# Patient Record
Sex: FEMALE | Race: WHITE | NOT HISPANIC OR LATINO
[De-identification: names, ages, dates, MRNs, and addresses within clinical notes are randomized per-mention and may not be internally consistent; named-entity substitution may affect disease eponyms.]

---

## 2017-05-22 ENCOUNTER — APPOINTMENT (OUTPATIENT)
Dept: OTOLARYNGOLOGY | Facility: CLINIC | Age: 22
End: 2017-05-22

## 2017-05-22 PROBLEM — Z00.00 ENCOUNTER FOR PREVENTIVE HEALTH EXAMINATION: Status: ACTIVE | Noted: 2017-05-22

## 2017-06-06 ENCOUNTER — OUTPATIENT (OUTPATIENT)
Dept: OUTPATIENT SERVICES | Facility: HOSPITAL | Age: 22
LOS: 1 days | Discharge: HOME | End: 2017-06-06

## 2017-06-06 ENCOUNTER — APPOINTMENT (OUTPATIENT)
Dept: OTOLARYNGOLOGY | Facility: AMBULATORY SURGERY CENTER | Age: 22
End: 2017-06-06

## 2017-06-16 ENCOUNTER — APPOINTMENT (OUTPATIENT)
Dept: OTOLARYNGOLOGY | Facility: CLINIC | Age: 22
End: 2017-06-16

## 2017-06-16 VITALS — WEIGHT: 150 LBS | HEIGHT: 67 IN | BODY MASS INDEX: 23.54 KG/M2

## 2017-06-23 ENCOUNTER — APPOINTMENT (OUTPATIENT)
Dept: OTOLARYNGOLOGY | Facility: CLINIC | Age: 22
End: 2017-06-23

## 2017-06-23 VITALS — WEIGHT: 150 LBS | HEIGHT: 67 IN | BODY MASS INDEX: 23.54 KG/M2

## 2017-06-28 DIAGNOSIS — J34.3 HYPERTROPHY OF NASAL TURBINATES: ICD-10-CM

## 2017-06-28 DIAGNOSIS — G47.33 OBSTRUCTIVE SLEEP APNEA (ADULT) (PEDIATRIC): ICD-10-CM

## 2017-07-07 ENCOUNTER — APPOINTMENT (OUTPATIENT)
Dept: OTOLARYNGOLOGY | Facility: CLINIC | Age: 22
End: 2017-07-07

## 2017-10-20 ENCOUNTER — APPOINTMENT (OUTPATIENT)
Dept: OTOLARYNGOLOGY | Facility: CLINIC | Age: 22
End: 2017-10-20

## 2017-11-03 ENCOUNTER — APPOINTMENT (OUTPATIENT)
Dept: OTOLARYNGOLOGY | Facility: CLINIC | Age: 22
End: 2017-11-03
Payer: COMMERCIAL

## 2017-11-03 VITALS — HEIGHT: 67 IN | BODY MASS INDEX: 23.54 KG/M2 | WEIGHT: 150 LBS

## 2017-11-03 DIAGNOSIS — J35.01 CHRONIC TONSILLITIS: ICD-10-CM

## 2017-11-03 DIAGNOSIS — J34.89 OTHER SPECIFIED DISORDERS OF NOSE AND NASAL SINUSES: ICD-10-CM

## 2017-11-03 DIAGNOSIS — J30.9 ALLERGIC RHINITIS, UNSPECIFIED: ICD-10-CM

## 2017-11-03 DIAGNOSIS — Z80.1 FAMILY HISTORY OF MALIGNANT NEOPLASM OF TRACHEA, BRONCHUS AND LUNG: ICD-10-CM

## 2017-11-03 PROCEDURE — 99213 OFFICE O/P EST LOW 20 MIN: CPT | Mod: 25

## 2017-11-03 PROCEDURE — 31231 NASAL ENDOSCOPY DX: CPT

## 2017-11-03 RX ORDER — FLUTICASONE PROPIONATE 50 UG/1
50 SPRAY, METERED NASAL
Qty: 1 | Refills: 2 | Status: ACTIVE | COMMUNITY
Start: 2017-11-03 | End: 1900-01-01

## 2018-09-25 ENCOUNTER — RESULT REVIEW (OUTPATIENT)
Age: 23
End: 2018-09-25

## 2018-09-25 ENCOUNTER — OUTPATIENT (OUTPATIENT)
Dept: OUTPATIENT SERVICES | Facility: HOSPITAL | Age: 23
LOS: 1 days | Discharge: HOME | End: 2018-09-25

## 2018-09-25 DIAGNOSIS — Z01.419 ENCOUNTER FOR GYNECOLOGICAL EXAMINATION (GENERAL) (ROUTINE) WITHOUT ABNORMAL FINDINGS: ICD-10-CM

## 2019-04-05 DIAGNOSIS — Z86.19 PERSONAL HISTORY OF OTHER INFECTIOUS AND PARASITIC DISEASES: ICD-10-CM

## 2019-04-05 RX ORDER — NORETHINDRONE ACETATE AND ETHINYL ESTRADIOL AND FERROUS FUMARATE 1MG-20(21)
1-20 KIT ORAL DAILY
Qty: 3 | Refills: 3 | Status: ACTIVE | COMMUNITY
Start: 2019-04-05 | End: 1900-01-01

## 2019-04-22 RX ORDER — FLUCONAZOLE 150 MG/1
150 TABLET ORAL
Qty: 1 | Refills: 2 | Status: ACTIVE | COMMUNITY
Start: 2019-04-22 | End: 1900-01-01

## 2019-07-16 ENCOUNTER — APPOINTMENT (OUTPATIENT)
Dept: OBGYN | Facility: CLINIC | Age: 24
End: 2019-07-16

## 2019-08-26 ENCOUNTER — APPOINTMENT (OUTPATIENT)
Dept: OBGYN | Facility: CLINIC | Age: 24
End: 2019-08-26
Payer: COMMERCIAL

## 2019-08-26 VITALS
BODY MASS INDEX: 24.64 KG/M2 | HEIGHT: 67 IN | WEIGHT: 157 LBS | SYSTOLIC BLOOD PRESSURE: 120 MMHG | HEART RATE: 87 BPM | RESPIRATION RATE: 18 BRPM | DIASTOLIC BLOOD PRESSURE: 71 MMHG

## 2019-08-26 DIAGNOSIS — R10.2 PELVIC AND PERINEAL PAIN: ICD-10-CM

## 2019-08-26 DIAGNOSIS — N94.89 OTHER SPECIFIED CONDITIONS ASSOCIATED WITH FEMALE GENITAL ORGANS AND MENSTRUAL CYCLE: ICD-10-CM

## 2019-08-26 DIAGNOSIS — N94.5 SECONDARY DYSMENORRHEA: ICD-10-CM

## 2019-08-26 PROCEDURE — 99213 OFFICE O/P EST LOW 20 MIN: CPT | Mod: 25

## 2019-08-26 PROCEDURE — 76830 TRANSVAGINAL US NON-OB: CPT

## 2019-08-26 RX ORDER — ETONOGESTREL AND ETHINYL ESTRADIOL .12; .015 MG/D; MG/D
0.12-0.015 INSERT, EXTENDED RELEASE VAGINAL
Qty: 3 | Refills: 3 | Status: ACTIVE | COMMUNITY
Start: 2019-08-26 | End: 1900-01-01

## 2019-08-26 NOTE — PROCEDURE
[Pelvic Pain] : pelvic pain [Transvaginal Ultrasound] : transvaginal ultrasound [Present] : uterus present [Anteverted] : anteverted [L: ___ cm] : L: [unfilled] cm [W: ___cm] : W: [unfilled] cm [FreeTextEntry6] : THIN LINING [FreeTextEntry8] : 1.77 [FreeTextEntry7] : 1.38 [FreeTextEntry4] : nORMAL EXAM

## 2019-08-26 NOTE — CHIEF COMPLAINT
[Follow Up] : follow up GYN visit [FreeTextEntry1] : GABRIELLE CASALE is a 23 year female\par WITH PAIN

## 2019-11-07 ENCOUNTER — APPOINTMENT (OUTPATIENT)
Dept: OBGYN | Facility: CLINIC | Age: 24
End: 2019-11-07

## 2019-12-03 ENCOUNTER — APPOINTMENT (OUTPATIENT)
Dept: OBGYN | Facility: CLINIC | Age: 24
End: 2019-12-03
Payer: COMMERCIAL

## 2019-12-03 ENCOUNTER — OUTPATIENT (OUTPATIENT)
Dept: OUTPATIENT SERVICES | Facility: HOSPITAL | Age: 24
LOS: 1 days | Discharge: HOME | End: 2019-12-03

## 2019-12-03 VITALS
DIASTOLIC BLOOD PRESSURE: 70 MMHG | SYSTOLIC BLOOD PRESSURE: 114 MMHG | WEIGHT: 150 LBS | BODY MASS INDEX: 23.27 KG/M2 | HEIGHT: 67.5 IN

## 2019-12-03 PROCEDURE — 99395 PREV VISIT EST AGE 18-39: CPT

## 2019-12-04 DIAGNOSIS — Z01.419 ENCOUNTER FOR GYNECOLOGICAL EXAMINATION (GENERAL) (ROUTINE) WITHOUT ABNORMAL FINDINGS: ICD-10-CM

## 2019-12-05 LAB
C TRACH RRNA SPEC QL NAA+PROBE: NOT DETECTED
N GONORRHOEA RRNA SPEC QL NAA+PROBE: NOT DETECTED
SOURCE AMPLIFICATION: NORMAL

## 2020-04-25 ENCOUNTER — MESSAGE (OUTPATIENT)
Age: 25
End: 2020-04-25

## 2020-07-27 RX ORDER — ETONOGESTREL AND ETHINYL ESTRADIOL 11.7; 2.7 MG/1; MG/1
0.12-0.015 INSERT, EXTENDED RELEASE VAGINAL
Qty: 3 | Refills: 3 | Status: ACTIVE | COMMUNITY
Start: 2020-07-27 | End: 1900-01-01

## 2020-07-30 RX ORDER — ETONOGESTREL AND ETHINYL ESTRADIOL .12; .015 MG/D; MG/D
0.12-0.015 INSERT, EXTENDED RELEASE VAGINAL
Qty: 3 | Refills: 3 | Status: ACTIVE | COMMUNITY
Start: 2020-07-30 | End: 1900-01-01

## 2020-11-29 ENCOUNTER — EMERGENCY (EMERGENCY)
Facility: HOSPITAL | Age: 25
LOS: 0 days | Discharge: HOME | End: 2020-11-29
Attending: EMERGENCY MEDICINE | Admitting: EMERGENCY MEDICINE
Payer: COMMERCIAL

## 2020-11-29 VITALS — WEIGHT: 149.91 LBS | HEIGHT: 67 IN

## 2020-11-29 VITALS
HEART RATE: 72 BPM | RESPIRATION RATE: 18 BRPM | TEMPERATURE: 97 F | OXYGEN SATURATION: 100 % | WEIGHT: 139.99 LBS | HEIGHT: 67 IN | SYSTOLIC BLOOD PRESSURE: 139 MMHG | DIASTOLIC BLOOD PRESSURE: 75 MMHG

## 2020-11-29 DIAGNOSIS — S81.812A LACERATION WITHOUT FOREIGN BODY, LEFT LOWER LEG, INITIAL ENCOUNTER: ICD-10-CM

## 2020-11-29 DIAGNOSIS — Z79.899 OTHER LONG TERM (CURRENT) DRUG THERAPY: ICD-10-CM
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
## 2020-11-29 DIAGNOSIS — Z88.0 ALLERGY STATUS TO PENICILLIN: ICD-10-CM

## 2020-11-29 DIAGNOSIS — Z90.89 ACQUIRED ABSENCE OF OTHER ORGANS: ICD-10-CM

## 2020-11-29 DIAGNOSIS — Z98.890 OTHER SPECIFIED POSTPROCEDURAL STATES: Chronic | ICD-10-CM
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
## 2020-11-29 DIAGNOSIS — Z88.8 ALLERGY STATUS TO OTHER DRUGS, MEDICAMENTS AND BIOLOGICAL SUBSTANCES: ICD-10-CM
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
## 2020-11-29 PROCEDURE — 12002 RPR S/N/AX/GEN/TRNK2.6-7.5CM: CPT

## 2020-11-29 PROCEDURE — 99283 EMERGENCY DEPT VISIT LOW MDM: CPT | Mod: 25

## 2020-11-29 RX ORDER — HYDROXYZINE HCL 10 MG
0 TABLET ORAL
Qty: 0 | Refills: 0 | DISCHARGE

## 2020-11-29 NOTE — ED PROVIDER NOTE - NSFOLLOWUPINSTRUCTIONS_ED_ALL_ED_FT
Laceration    A laceration is a cut that goes through all of the layers of the skin and into the tissue that is right under the skin. Some lacerations heal on their own. Others need to be closed with stitches (sutures), staples, skin adhesive strips, or skin glue. Proper laceration care minimizes the risk of infection and helps the laceration to heal better.     SEEK IMMEDIATE MEDICAL CARE IF YOU HAVE THE FOLLOWING SYMPTOMS: swelling around the wound, worsening pain, drainage from the wound, red streaking going away from your wound, inability to move finger or toe near the laceration, or discoloration of skin near the laceration.     suture removal in 14 days

## 2020-11-29 NOTE — ED PROVIDER NOTE - NS ED ROS FT
ROS:     constitutional: no fever, weight loss  msk: no joint pain or difficulty ROM  skin: +laceation   neuro: no tingling , weakness , difficulty ambulation

## 2020-11-29 NOTE — ED PROVIDER NOTE - PHYSICAL EXAMINATION
Vital Signs: I have reviewed the initial vital signs.  Constitutional: well-nourished, no acute distress  Musculoskeletal: good ROM of extremity,  no bony tenderness, no deformity, good peripheral pulses  Integumentary: (+)6.0 cm laceration v shaped to left lower leg. no foreign body. no surrounding erythema or bruising   Neurologic: awake, alert, extremities’ motor and sensory functions grossly intact, no focal deficits  heme: (-) no adenopathy (-)lymphangitis

## 2020-11-29 NOTE — ED PROVIDER NOTE - PATIENT PORTAL LINK FT
You can access the FollowMyHealth Patient Portal offered by [facility] by registering at the following website: [email or web address]. By joining [employer]’s FollowMyHealth portal, you will also be able to view your health information using other applications (apps) compatible with our system.

## 2020-11-29 NOTE — ED PROVIDER NOTE - OBJECTIVE STATEMENT
25 y/o female presents to the Ed s/p direct blow on metal oven sustaining laceration to left lower leg. patient with bleeding from wound. c/o throbbing pain. no tingling distally . no swelling or bruising surrounding wound

## 2020-11-29 NOTE — ED ADULT NURSE NOTE - WOUND TYPE
pt states the door of the stove was down and bumped into the door with her left leg, has laceration to area

## 2020-12-21 PROBLEM — Z86.19 HISTORY OF CANDIDIASIS OF VAGINA: Status: RESOLVED | Noted: 2019-04-22 | Resolved: 2020-12-21

## 2021-01-04 RX ORDER — ETONOGESTREL AND ETHINYL ESTRADIOL 11.7; 2.7 MG/1; MG/1
0.12-0.015 INSERT, EXTENDED RELEASE VAGINAL
Qty: 3 | Refills: 3 | Status: ACTIVE | COMMUNITY
Start: 2021-01-04 | End: 1900-01-01

## 2021-01-06 PROBLEM — Z78.9 OTHER SPECIFIED HEALTH STATUS: Chronic | Status: ACTIVE | Noted: 2020-11-29

## 2021-01-19 ENCOUNTER — APPOINTMENT (OUTPATIENT)
Dept: OBGYN | Facility: CLINIC | Age: 26
End: 2021-01-19
Payer: COMMERCIAL

## 2021-01-19 VITALS
HEIGHT: 67 IN | DIASTOLIC BLOOD PRESSURE: 78 MMHG | BODY MASS INDEX: 23.54 KG/M2 | WEIGHT: 150 LBS | SYSTOLIC BLOOD PRESSURE: 120 MMHG

## 2021-01-19 DIAGNOSIS — E78.00 PURE HYPERCHOLESTEROLEMIA, UNSPECIFIED: ICD-10-CM

## 2021-01-19 PROCEDURE — 99072 ADDL SUPL MATRL&STAF TM PHE: CPT

## 2021-01-19 PROCEDURE — 99395 PREV VISIT EST AGE 18-39: CPT

## 2021-01-19 NOTE — COUNSELING
[Contraception/ Emergency Contraception/ Safe Sexual Practices] : contraception, emergency contraception, safe sexual practices [FreeTextEntry2] : DISCUSSED COVID AND LABS.

## 2021-01-25 LAB — CYTOLOGY CVX/VAG DOC THIN PREP: NORMAL

## 2021-02-02 LAB
ALBUMIN SERPL ELPH-MCNC: 4.5 G/DL
ALP BLD-CCNC: 50 U/L
ALT SERPL-CCNC: 18 U/L
ANION GAP SERPL CALC-SCNC: 11 MMOL/L
AST SERPL-CCNC: 21 U/L
BASOPHILS # BLD AUTO: 0.02 K/UL
BASOPHILS NFR BLD AUTO: 0.5 %
BILIRUB SERPL-MCNC: 0.4 MG/DL
BUN SERPL-MCNC: 19 MG/DL
CALCIUM SERPL-MCNC: 10.6 MG/DL
CHLORIDE SERPL-SCNC: 104 MMOL/L
CHOLEST SERPL-MCNC: 174 MG/DL
CO2 SERPL-SCNC: 25 MMOL/L
CREAT SERPL-MCNC: 1 MG/DL
EOSINOPHIL # BLD AUTO: 0.08 K/UL
EOSINOPHIL NFR BLD AUTO: 1.9 %
GLUCOSE SERPL-MCNC: 86 MG/DL
HCT VFR BLD CALC: 40.3 %
HDLC SERPL-MCNC: 61 MG/DL
HGB BLD-MCNC: 12.6 G/DL
IMM GRANULOCYTES NFR BLD AUTO: 0.2 %
LDLC SERPL CALC-MCNC: 97 MG/DL
LYMPHOCYTES # BLD AUTO: 1.56 K/UL
LYMPHOCYTES NFR BLD AUTO: 37.2 %
MAN DIFF?: NORMAL
MCHC RBC-ENTMCNC: 30.4 PG
MCHC RBC-ENTMCNC: 31.3 G/DL
MCV RBC AUTO: 97.1 FL
MONOCYTES # BLD AUTO: 0.26 K/UL
MONOCYTES NFR BLD AUTO: 6.2 %
NEUTROPHILS # BLD AUTO: 2.26 K/UL
NEUTROPHILS NFR BLD AUTO: 54 %
NONHDLC SERPL-MCNC: 113 MG/DL
PLATELET # BLD AUTO: 218 K/UL
POTASSIUM SERPL-SCNC: 4.5 MMOL/L
PROT SERPL-MCNC: 7.3 G/DL
RBC # BLD: 4.15 M/UL
RBC # FLD: 12.5 %
SARS-COV-2 IGG SERPL IA-ACNC: <0.1 INDEX
SARS-COV-2 IGG SERPL QL IA: NEGATIVE
SODIUM SERPL-SCNC: 140 MMOL/L
TRIGL SERPL-MCNC: 110 MG/DL
WBC # FLD AUTO: 4.19 K/UL

## 2021-03-02 ENCOUNTER — APPOINTMENT (OUTPATIENT)
Dept: OBGYN | Facility: CLINIC | Age: 26
End: 2021-03-02
Payer: COMMERCIAL

## 2021-03-02 VITALS
SYSTOLIC BLOOD PRESSURE: 127 MMHG | HEART RATE: 90 BPM | BODY MASS INDEX: 23.07 KG/M2 | HEIGHT: 67 IN | WEIGHT: 147 LBS | DIASTOLIC BLOOD PRESSURE: 78 MMHG | RESPIRATION RATE: 20 BRPM

## 2021-03-02 DIAGNOSIS — N92.6 IRREGULAR MENSTRUATION, UNSPECIFIED: ICD-10-CM

## 2021-03-02 PROCEDURE — 76830 TRANSVAGINAL US NON-OB: CPT

## 2021-03-02 PROCEDURE — 99072 ADDL SUPL MATRL&STAF TM PHE: CPT

## 2021-03-02 PROCEDURE — 99213 OFFICE O/P EST LOW 20 MIN: CPT | Mod: 25

## 2021-03-02 NOTE — PROCEDURE
[Abnormal Uterine Bleeding] : abnormal uterine bleeding [Transvaginal Ultrasound] : transvaginal ultrasound [Anteverted] : anteverted [L: ___ cm] : L: [unfilled] cm [W: ___cm] : W: [unfilled] cm [FreeTextEntry5] : 2.47 x 2.25 fibroid [FreeTextEntry7] : 1.62 [FreeTextEntry4] : Fibroid uterus.

## 2021-03-02 NOTE — PLAN
[FreeTextEntry1] : GABRIELLE CASALE is a 25 year female\par on Nuvaring.  disussed fibroid uterus.

## 2021-03-25 ENCOUNTER — RX RENEWAL (OUTPATIENT)
Age: 26
End: 2021-03-25

## 2021-03-25 RX ORDER — FLUCONAZOLE 150 MG/1
150 TABLET ORAL
Qty: 1 | Refills: 2 | Status: ACTIVE | COMMUNITY
Start: 2021-03-25 | End: 1900-01-01

## 2021-03-25 RX ORDER — FLUCONAZOLE 150 MG/1
150 TABLET ORAL
Qty: 1 | Refills: 2 | Status: ACTIVE | COMMUNITY
Start: 2021-01-04 | End: 1900-01-01

## 2021-05-18 ENCOUNTER — EMERGENCY (EMERGENCY)
Facility: HOSPITAL | Age: 26
LOS: 0 days | Discharge: HOME | End: 2021-05-18
Attending: EMERGENCY MEDICINE | Admitting: EMERGENCY MEDICINE
Payer: OTHER MISCELLANEOUS

## 2021-05-18 VITALS
DIASTOLIC BLOOD PRESSURE: 84 MMHG | OXYGEN SATURATION: 100 % | HEART RATE: 82 BPM | WEIGHT: 110.01 LBS | SYSTOLIC BLOOD PRESSURE: 136 MMHG | HEIGHT: 67 IN | TEMPERATURE: 98 F | RESPIRATION RATE: 16 BRPM

## 2021-05-18 DIAGNOSIS — Z88.0 ALLERGY STATUS TO PENICILLIN: ICD-10-CM

## 2021-05-18 DIAGNOSIS — Z88.6 ALLERGY STATUS TO ANALGESIC AGENT: ICD-10-CM

## 2021-05-18 DIAGNOSIS — S61.231A PUNCTURE WOUND WITHOUT FOREIGN BODY OF LEFT INDEX FINGER WITHOUT DAMAGE TO NAIL, INITIAL ENCOUNTER: ICD-10-CM

## 2021-05-18 DIAGNOSIS — Z98.890 OTHER SPECIFIED POSTPROCEDURAL STATES: Chronic | ICD-10-CM
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
## 2021-05-18 PROCEDURE — 99284 EMERGENCY DEPT VISIT MOD MDM: CPT

## 2021-05-18 NOTE — ED PROVIDER NOTE - PHYSICAL EXAMINATION
CONSTITUTIONAL: Well-developed; well-nourished; in no acute distress.   SKIN: warm, dry  HEAD: Normocephalic; atraumatic  CARD: S1, S2 normal; no murmurs, gallops, or rubs. Regular rate and rhythm  RESP: No wheezes, rales or rhonchi  EXT: Normal ROM.  No clubbing, cyanosis or edema, left index finger nonerythematous, no rash, no TTP  NEURO: Alert, oriented, grossly unremarkable  PSYCH: Cooperative, appropriate.

## 2021-05-18 NOTE — ED ADULT NURSE NOTE - NSIMPLEMENTINTERV_GEN_ALL_ED
Implemented All Universal Safety Interventions:  [place] to call system. Call bell, personal items and telephone within reach. Instruct patient to call for assistance. Room bathroom lighting operational. Non-slip footwear when patient is off stretcher. Physically safe environment: no spills, clutter or unnecessary equipment. Stretcher in lowest position, wheels locked, appropriate side rails in place.

## 2021-05-18 NOTE — ED PROVIDER NOTE - PATIENT PORTAL LINK FT
You can access the FollowMyHealth Patient Portal offered by [facility] by registering at the following website: [email or web address]. By joining [employer]’s FollowMyHealth portal, you will also be able to view your health information using other applications (apps) compatible with our system.

## 2021-05-18 NOTE — ED PROVIDER NOTE - ATTENDING CONTRIBUTION TO CARE
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 WIll draw baseline labs per protocol, start PrEP, f/u employee health. VS, exam as noted. 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 VS, exam as noted, pt is in communication with RN manager to arrange for pt testing. PrEP, employee health f/u.

## 2021-05-18 NOTE — ED PROVIDER NOTE - OBJECTIVE STATEMENT
26yo w/o sig pmhx who present with needle stick. 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 currently no pain, fever, rash, or discharge. This occurred <1 hr ago.

## 2021-05-18 NOTE — ED PROVIDER NOTE - NSFOLLOWUPCLINICS_GEN_ALL_ED_FT
[facility]  Medicine  [address], [place]   Phone: [phone]  Fax:   Follow Up Time: 1-3 Days

## 2021-05-18 NOTE — ED PROVIDER NOTE - NSFOLLOWUPINSTRUCTIONS_ED_ALL_ED_FT
Please follow up at employee health clinic in 3 days.    Needlestick and Sharps Injury  ImageA needlestick injury happens when a person gets poked (stuck) by a needle or sharp tool (sharps) that may have someone else's blood on it. A needlestick injury can happen to a health care worker, or to anyone who is exposed to needles. The injury may expose you to blood that carries infections such as:  Hepatitis B.  Hepatitis C.  Human immunodeficiency virus (HIV).  If you have a needle stick injury or think you may have been exposed to blood or body fluids:  Wash the injured area right away with soap and water.  Place a bandage or clean towel on the wound and apply gentle pressure to stop the bleeding. Do not squeeze or rub the area.  Notify a work place supervisor or doctor. Follow any procedures in your work place.  Tell your doctor if you are pregnant or breastfeeding.  Treatments may include:  Blood tests to make sure that you have no infection.  Tetanus shot.  Hepatitis B shot.  Medicines to stop or treat infection.  Treatment for the wound.  Follow these instructions at home:  Wound care     There are many ways to close and cover a wound. For example, a wound can be covered with sutures, skin glue, or adhesive strips. Follow instructions from your doctor about:  How to take care of your wound.  When and how you should change your bandage (dressing).  Keep the bandage dry as told by your doctor.   Do not take baths, swim, use a hot tub, or do anything that would put your wound underwater until your doctor approves.  Check your wound every day for signs of infection. Check for:  Redness, swelling, or pain.  Fluid or blood.  Pus or a bad smell.  Warmth.  General instructions     Take over-the-counter and prescription medicines only as told by your doctor.  If you were prescribed an antibiotic medicine, take it as told by your doctor. Do not stop using the antibiotic even if you start to feel better.  Keep all follow-up visits as told by your doctor. This is important.  Contact a doctor if:  The poked area is red, swollen, or painful.  You have a fever.  You feel worried (anxious), mad, or sad (depressed).  You have trouble sleeping.  Your skin or the whites of your eyes look yellow (jaundice).  You have belly pain or a feeling of fullness.  You have tiredness (fatigue).  You feel sickness in a lot of your body (malaise).  You get infections often.  Summary  A needlestick injury happens when a person gets poked (stuck) by a needle that may have someone else's blood on it.  It is treated by cleaning the injured area right away with soap and water. You may get tetanus and hepatitis B shots. You may also get medicines for infections.  Take medicine and care for your wound as told by your doctor.  This information is not intended to replace advice given to you by your health care provider. Make sure you discuss any questions you have with your health care provider.

## 2021-05-18 NOTE — ED PROVIDER NOTE - NS ED ROS FT
Consitutional: No fever, chills, weight loss, generalized fatigue  Eyes:  No visual changes, eye pain or discharge  ENMT:  No hearing changes, pain, discharge or infections; No neck pain, stiffness, or edema  Cardiac:  No chest pain, SOB or edema  Respiratory:  No cough, hemoptysis, or rhinorrhea  GI:  No abdominal pain, nausea, vomiting, constipation or diarrhea  MS:  No myalgia, muscle weakness, joint pain or back pain  Neuro:  No headache or weakness.  No LOC.  Skin:  No skin rash, ecchymosis, abrasion, or lesions

## 2021-07-14 NOTE — ED ADULT NURSE NOTE - CAS DISCH BELONGINGS RETURNED
General: +fever, chills; denies weight loss/weight gain.  HENT: denies nasal congestion, sore throat, rhinorrhea, ear pain.  Eyes: denies head trauma.  Neck: denies neck pain.  CV: denies chest pain.  Resp: denies difficulty breathing.  Abdominal: n/v, abd pain w/ abd distention.  MSK: denies muscle aches, bony pain, leg pain, leg swelling.  Neuro: transient AMS while febrile at home.  Skin: denies rashes, cuts, bruises.  Hematologic: denies unexplained bruises. Not applicable

## 2021-08-13 NOTE — ED ADULT TRIAGE NOTE - HEIGHT IN CM
[facility]  Palliative Care Progress Note  Text Page     Assessment & Plan   Recommendations:  Alexandra Alfaro is a 67 year old male who was admitted on 8/12/2021.   Consulted by Dr. Anil Colin  to assist with symptom management, goals of care, and development of plan of care in the setting of metastatic liver cancer, severe malnutrition, coagulopathies, hyponatremia      Recommendations:  1. Goals of Care- No CPR- Pre-arrest intubation OK  Hospitalization goals discussed  code status discussed with Dr. Navarrete and family changed as above   Decisional Capacity- Intact. Patient does not have an advance directive. Per  informed consent policy next of kin should be involved if patient becomes unable.  POLST  Complete prior to discharge.     2. Pain related to metastatic liver cancer to bone with poor prognosis  Patient's opioid use in past 24 hours: Morphine  IV 2 mg, Dilaudid 0.4 mg =  14 mg Daily Morphine Equivalent  [place] Board of Pharmacy Data Base Reviewed:    YES; As expected, no concern for misuse/abuse of controlled medications based on this report.  ORT  0  ( add dot phrase .FRHORT if warranted)  -dilaudid 2 mg every 2 hrs prn pain/ SOB  -dilaudid 0.2-0.4 mg every hour prn         3. Dyspnea none noted in the setting of ascites   -O2 prn comfort   -ativan po or IV every 4 hrs prn   -dilaudid as above      4. Nausea   -schedule zofran 4-8 mg every 6 hrs     5. Spiritual Care  Oriented to Spiritual Health as part of Palliative Care team. Consultation placed for  to follow. Appreciate care of Og Qureshi,  intern and connect with Wendie  Spiritual Background:  Icelandic      5. Care Planning  SW consultation placed for discharge planning .  Medications for discharge too be determined       Avani Bergeron RN,PGMT-BC ,APRN, CNP,ACHPN  Pain Management and Palliative Care  [facility]  Pgr: [phone]      Time Spent on this Encounter  "  Total unit/floor time 8:50-9:05 am  minutes, time consisted of the following, examination of the patient, reviewing the record and completing documentation. >50% of time spent in counseling and coordination of care, Bedside Nurse Kashmir Manning RN  and Hospitalist Grant Ramsay MD , Carlito Rosario, spiritual health.  Time spend counseling with patient consisted of the following topics, symptom management.    Interval visit:    Comfortable  No pain, + nausea, no SOB. ( used  application on phone  Dr. Navarrete talked to family today.   Daughters present today       Medical Decision Making and Goals of Care:  Discussed on August 12, 2021 with Avani Vergara-Ronnie SOLORZANO, CNP:  I met with the family in the presence of the patient.  The daughter MaMa daughter Tracy and spouse ( non English speaking) in the ED bed.  Patient  Appears very weak and restless.  Selina tells me he vomited and had fever two days ago, saw Dr. Navarrete yesterday. The patient did not want to come back to hospital so they tried to manage at home.  I am familiar with family and patient from last admission.  Selina states they did tell the patient he was dying but not everything.     Used Irish interpretor with family conference w/o patient present.  The family has mixed feeling, but the Aunt was most vocal about goals of care.  They realize the end may be near,but spiritually only \"he knows\" so all must be done until then.  Voiced my concerns about escalation of cares, increasing suffering, futility.  Stated the decisions are their's,  my concerns with suffering and reduced QoL were voiced They agreed all wanted his comfortable and symptoms managed. Some times the better place for him is in the hospital.   Spouse looking exhausted and daughters very sad. Spouse feel asleep and Aunt stated she is so depressed. This is hard on them and the daughters, being the primary caregivers.    I then met with the patient ,aunt and daughter present as well " as interpretor.  He support being home over hospital, but also feels he still wants to come back to hospital, when measures not working at home.  I explained that his liver is not working well and blood not clotting well.  He would prefer to be at home, but again would seek out the hospital if better comfort is available.   The family is considering transition to a comfort plan, but want 24-48 hours of antibiotics IV. I circled back after giving them space to discuss care escalation.  When I returned they decided no change in code status. I again relayed concerns noted above with care escalation.  I offered to have ED provider come in to answer questions and have discussion on code status change and they agreed to this.   Much of their core beliefs are from culture and spirituality of islamic alber.  Will offer support and guidance of [facility] for family and patient.         Review of Systems    CONSTITUTIONAL: NEGATIVE for fever, chills, change in weight  ENT/MOUTH: NEGATIVE for ear, mouth and throat problems  RESP: NEGATIVE for significant cough or SOB  CV: NEGATIVE for chest pain, palpitations or peripheral edema  GI: POSITIVE for, abdominal pain generalized and nausea, ascites    Palliative Symptom Review (0=no symptom/no concern, 1=mild, 2=moderate, 3=severe):      Pain: 0-none      Fatigue: 2-moderate      Nausea: 2-moderate      Constipation: 0-none      Diarrhea: 0-none      Depressive Symptoms: 1-mild      Anxiety: 0-none      Drowsiness: 1-mild      Poor Appetite: 3-severe      Shortness of Breath: 1-mild      Insomnia: 0-none      Other: ascites  2-moderate      Overall (0 good/no concerns, 3 very poor):  2+    Physical Exam   Temp:  [96.1  F (35.6  C)-97.8  F (36.6  C)] 96.1  F (35.6  C)  Pulse:  [] 98  Resp:  [9-30] 16  BP: ()/(43-97) 96/46  SpO2:  [94 %-100 %] 100 %  0 lbs 0 oz  Exam:  GEN:  Alert, oriented x 3, appears comfortable, NAD.  HEENT:  Normocephalic/atraumatic, no scleral icterus, no  nasal discharge, mouth moist.  CV:  RRR, S1, S2; no murmurs or other irregularities noted.  +3 DP/PT pulses bilatererally; no edema BLE.  RESP:  Clear to auscultation bilaterally without rales/rhonchi/wheezing/retractions.  Symmetric chest rise on inhalation noted.  Normal respiratory effort.  ABD:  Rounded, soft, non-tender/non-distended.  +BS, diminished   EXT:  Edema & pulses as noted above.  CMS intact x 4.     M/S:   Nontender to palpation throughout .    SKIN:  Dry to touch, no exanthems noted in the visualized areas.    NEURO: Symmetric strength +5/5.  Sensation to touch intact all extremities.   There is no area of allodynia or hyperesthesia.  PAIN BEHAVIOR: Cooperative  Psych:  Normal affect.  Calm, cooperative, conversant appropriately.    Medications     octreotide (sandoSTATIN) infusion ADULT 50 mcg/hr (08/13/21 0822)     pantoprazole (PROTONIX) infusion ADULT/PEDS GREATER than or EQUAL to 45 kg 8 mg/hr (08/13/21 0817)       artificial saliva  5 mL Swish & Spit 4x Daily     dronabinol  5 mg Oral Daily     ondansetron  4-8 mg Oral Q6H    Or     ondansetron  4-8 mg Intravenous Q6H     piperacillin-tazobactam  3.375 g Intravenous Q6H     sodium chloride (PF)  3 mL Intracatheter Q8H     sodium chloride (PF)  3 mL Intracatheter Q8H     vancomycin  1,000 mg Intravenous Q24H       Data   Results for orders placed or performed during the hospital encounter of 08/12/21 (from the past 24 hour(s))   Comprehensive metabolic panel   Result Value Ref Range    Sodium 131 (L) 133 - 144 mmol/L    Potassium 4.8 3.4 - 5.3 mmol/L    Chloride 101 94 - 109 mmol/L    Carbon Dioxide (CO2) 12 (L) 20 - 32 mmol/L    Anion Gap 18 (H) 3 - 14 mmol/L    Urea Nitrogen 40 (H) 7 - 30 mg/dL    Creatinine 1.69 (H) 0.66 - 1.25 mg/dL    Calcium 7.2 (L) 8.5 - 10.1 mg/dL    Glucose 178 (H) 70 - 99 mg/dL    Alkaline Phosphatase 122 40 - 150 U/L     (H) 0 - 45 U/L     (H) 0 - 70 U/L    Protein Total 5.0 (L) 6.8 - 8.8 g/dL     Albumin 1.0 (L) 3.4 - 5.0 g/dL    Bilirubin Total 10.9 (H) 0.2 - 1.3 mg/dL    GFR Estimate 41 (L) >60 mL/min/1.73m2   INR   Result Value Ref Range    INR 1.79 (H) 0.85 - 1.15   Lactic acid whole blood   Result Value Ref Range    Lactic Acid 10.6 (HH) 0.7 - 2.0 mmol/L   Hemoglobin   Result Value Ref Range    Hemoglobin 7.2 (L) 13.3 - 17.7 g/dL   Lactic acid whole blood   Result Value Ref Range    Lactic Acid 13.3 (HH) 0.7 - 2.0 mmol/L   Hemoglobin   Result Value Ref Range    Hemoglobin 6.8 (LL) 13.3 - 17.7 g/dL   Lactic acid whole blood   Result Value Ref Range    Lactic Acid 9.3 (HH) 0.7 - 2.0 mmol/L   Blood gas venous   Result Value Ref Range    pH Venous 7.28 (L) 7.32 - 7.43    pCO2 Venous 32 (L) 40 - 50 mm Hg    pO2 Venous 47 25 - 47 mm Hg    Bicarbonate Venous 15 (L) 21 - 28 mmol/L    Base Excess/Deficit (+/-) -10.8 (L) -7.7 - 1.9 mmol/L    FIO2 98    CONDITIONAL Prepare red blood cells (unit)   Result Value Ref Range    CROSSMATCH Compatible     UNIT ABO/RH A Pos     Unit Number [id number]     UNIT STATUS Issued     Blood Component Type Red Blood Cells     Product Code T1107G10     CODING SYSTEM [id number]     UNIT TYPE ISBT 6200     ISSUE DATE AND TIME [id number]    Hemoglobin   Result Value Ref Range    Hemoglobin 8.4 (L) 13.3 - 17.7 g/dL   Comprehensive metabolic panel   Result Value Ref Range    Sodium 128 (L) 133 - 144 mmol/L    Potassium 5.9 (H) 3.4 - 5.3 mmol/L    Chloride 100 94 - 109 mmol/L    Carbon Dioxide (CO2) 20 20 - 32 mmol/L    Anion Gap 8 3 - 14 mmol/L    Urea Nitrogen 49 (H) 7 - 30 mg/dL    Creatinine 2.21 (H) 0.66 - 1.25 mg/dL    Calcium 7.6 (L) 8.5 - 10.1 mg/dL    Glucose 174 (H) 70 - 99 mg/dL    Alkaline Phosphatase 177 (H) 40 - 150 U/L    AST 1,755 (HH) 0 - 45 U/L     (HH) 0 - 70 U/L    Protein Total 5.2 (L) 6.8 - 8.8 g/dL    Albumin 1.1 (L) 3.4 - 5.0 g/dL    Bilirubin Total 12.4 (H) 0.2 - 1.3 mg/dL    GFR Estimate 30 (L) >60 mL/min/1.73m2   CBC with platelets   Result Value Ref  Range    WBC Count 13.7 (H) 4.0 - 11.0 10e3/uL    RBC Count 2.51 (L) 4.40 - 5.90 10e6/uL    Hemoglobin 8.4 (L) 13.3 - 17.7 g/dL    Hematocrit 24.5 (L) 40.0 - 53.0 %    MCV 98 78 - 100 fL    MCH 33.5 (H) 26.5 - 33.0 pg    MCHC 34.3 31.5 - 36.5 g/dL    RDW 19.0 (H) 10.0 - 15.0 %    Platelet Count 167 150 - 450 10e3/uL   Lactic acid whole blood   Result Value Ref Range    Lactic Acid 3.9 (H) 0.7 - 2.0 mmol/L   Hematology & Oncology IP Consult: Liver cancer; Consultant may enter orders: Yes; Patient to be seen: Routine - within 24 hours; Requested Clinic/Group: [facility]; Requesting provider? Hospitalist (if different from attending physician)    Narrative    Vinnie Navarrete MD     8/13/2021  1:14 PM    [facility]    Hematology / Oncology Consultation     Date of Admission:  8/12/2021    Assessment & Plan   Alexandra Alfaro is a 67 year old male who was admitted on   8/12/2021. I was asked to see the patient for history of   hepatocellular carcinoma.    Assessment:  Plan:  1.  Hepatocellular carcinoma  -Previous treatments included atezolizumab and bevacizumab.  Also   patient had liver directed therapies including yttrium-90 and   bland embolization.  -Recent scans from July 20th, evidence of progressive disease,   portal vein thrombosis.     Plan   -Had a long discussion with patient and his sister.   -With his declining performance status, worsening disease, and   liver decompensation, my recommendation was to transition to   comfort care only with hospice consultation.   -It is unlikely that further treatment would be helpful or   patient given even tolerate.  -We also discussed, CODE STATUS and I recommended that patient   transition to DNR/DNI, as resuscitative measures would be futile.     Following discussion patient and his sister agreed to switch to   DNR/DNI however they do not want to pursue hospice at this point   and still wanted continue with active treatment.  -Patient's sister mentions that  they do not want to stop   treatment until the end.   -Patient does not wish to go home, even if he is dying he would   prefer to die in hospital or nursing facility, as he does not   want his children or wife to see him in this way.   -They want to move to treatment with lenvatinib, I advised them   that it would not be advisable to start it at present however if   he improves we could pursue lenvatinib at that time.     2.  Portal vein thrombosis  -Patient was on anticoagulation with Lovenox    3.  Spot on bacterial peritonitis  -Patient is on IV antibiotics had paracentesis  -We will keep him on chronic antibiotics on discharge to prevent   further relapses of spontaneous bacterial peritonitis    4 Hepatitis C  -Patient has completed a series of treatment and is seronegative   now    5.  Cirrhosis of liver    6.  GI bleeding  -Would recommend a GI consult    Vinnie Navarrete MD   [facility]  Office:  [phone]    Code Status    No CPR- Pre-arrest intubation OK    Reason for Consult   Reason for consult: History of hepatocellular carcinoma.     Primary Care Physician   Lindy Dodge    Chief Complaint   Fever abdominal pain      History of Present Illness   Alexandra Alfaro is a 67 year old male who presents with fever and   abdominal pain.    Patient is well-known to me, is been undergoing treatment for   hepatocellular carcinoma under my care.  Please see oncologic   chronology below.  Patient was recently hospitalized with similar symptoms of   abdominal pain abdominal distention new ascites and jaundice was   found to have portal vein thrombosis and spontaneous bacterial   peritonitis.   Patient responded to conservative management with paracentesis IV   antibiotics and was discharged home.   He presented to my clinic day before yesterday with complaints of   nausea vomiting abdominal pain fever.  I advised him to go to the   hospital as I was concerned about spontaneous bacterial   peritonitis however  patient did not wanted to go to hospital thus   he was started on oral ciprofloxacin.  Overnight yesterday   patient had high fever and vomited blood and family brought him   to the ER.     This morning he feels better continues to remain very fatigued   has no appetite continues to have pain in abdomen however better   abdominal distention is better.    Oncologic chronology  67-year-old [place] man, was recently hospitalized for COVID-19   pneumonia.   Hospital work-up was significant for a 12.7 x 12.6 x 10.5 cm   heterogeneous right hepatic lobe mass.  Occupying much of the   right hepatic lobe, the findings on MRI were consistent with   hepatocellular carcinoma.  There was hepatic cirrhosis.  Further   work-up showed patient was positive for hepatitis C.  Labs done in the hospital showed  AFP 26 CA was 19.957   bilirubin 0.8 ALT 75 , however these were done at the time   when patient had COVID-19 infection and may not be a true   representation of his baseline liver function.  Patient denies   any history of blood transfusions or IV drug use.  Was not aware   of hepatitis C infection prior to this recent hospital admission.     2/15/2020: Patient had a repeat MRI, and a follow-up with Dr. Hansen in hepatobiliary service at [facility].  The MRI   showed liver mass measuring 13.3 x 11.9 x 13.1 cm.  Unfortunately   there were additional foci of enhancement.  These findings were   compatible with multifocal HCC Dustin RADS category LR 5.  Dr. Hansen recommended against surgical resection.  Patient is now   awaiting interventional radiology consult to see if he would be   candidate for liver directed therapy.   2/24/2020: Patient had a follow-up in medical oncology clinic we   have discussed the results from new MRI and systemic therapy with   atezolizumab and bevacizumab.   3/8/2021 Patient had a CT scan of chest to complete his staging   There was a single borderline enlarged lymph node at  station 2R.   3/8/2021: Patient had a consultation with interventional   radiology interventional radiology recommended thousand arterial   liver directed therapy with yttrium 90 radioembolization.  3/15/2020 when patient had SPECT scan there was inhomogeneous   uptake of activity in right lobe of liver consistent with known   liver tumor there was no focal area of extrahepatic uptake  3/15/2021 patient underwent a planning session with hepatic   arteriogram there was variant hepatic arterial anatomy there was   small foci of disease noted throughout both lobes of liver the   dominant lesion and majority of daughter lesions were consistent   with hepatocellular carcinoma in posterior right hepatic lobe   perfused by accessory right hepatic artery.   A test dose of MAA was delivered to accessory right hepatic   artery which was delivered a high lung shunt of 15.3% Dr. Lopez   felt that due to the shunt he would like to do the procedure over   2 sessions 1 month apart. A liver biopsy was also completed which   confirmed presence ofWell-differentiated hepatocellular   carcinoma.   3/19/2021 patient started on systemic therapy with atezolizumab   alone,  bevacizumab was hold as patient is going through yetrium   90 treatment.   3/25/2021 Patient has the first procedure of yttrium 90   microsphere administration.  Patient did not receive the full   dose due to presence of a hepatopulmonary shunt. Patient   tolerated procedure well.  5/6/2021: Patient has persistent hepatopulmonary shunt and was   unable to receive additional Y 90 radioembolization.  He received   hepatic angiogram and planned embolization of hepatocellular   carcinoma right hepatic lobe.  Procedure was complicated with   significant pain and development of decompensation of liver with   ascites.   7/20/2021 2 8/2/2021: Patient was hospitalized with sudden onset   of abdominal pain and liver decompensation.  Work-up during the   hospital was consistent  with portal vein thrombosis, worsening   hepatocellular carcinoma in the liver, with extension beyond the   liver capsule invading chest wall.  Due to decompensation we   recommended against further treatment, patient will receive   treatment for spontaneous bacterial peritonitis and portal vein   thrombosis with anticoagulation was able to be discharged home.    Past Medical History   I have reviewed this patient's medical history and updated it   with pertinent information if needed.   Past Medical History:   Diagnosis Date     2019 novel coronavirus disease (COVID-19) 12/25/2020     Depression      Hepatitis C      Hepatocellular carcinoma        Past Surgical History   I have reviewed this patient's surgical history and updated it   with pertinent information if needed.  Past Surgical History:   Procedure Laterality Date     DAVINCI HERNIORRHAPHY INGUINAL Right 08/07/2018    Procedure: DAVINCI HERNIORRHAPHY INGUINAL;  Robotic Assisted   Right Inguinal Hernia Repair with Mesh ;  Surgeon: Prabhu Allred MD;  Location: [facility] OR     DIAPHRAGM SURGERY  2018     ERCP with FNA  03/2021     Hepatic angiogram with radioembolization  03/2021       Prior to Admission Medications   Prior to Admission Medications   Prescriptions Last Dose Informant Patient Reported? Taking?   ciprofloxacin (CIPRO) 500 MG tablet 8/11/2021 at Unknown time    Yes Yes   Sig: Take 1 tablet by mouth 2 times daily   dronabinol (MARINOL) 5 MG capsule 8/11/2021 at AM  No Yes   Sig: Take 1 capsule (5 mg) by mouth daily   enoxaparin ANTICOAGULANT (LOVENOX) 120 MG/0.8ML syringe 8/11/2021   at AM  No Yes   Sig: Inject 0.8 mLs (120 mg) Subcutaneous every 24 hours   famotidine (PEPCID) 20 MG tablet 8/11/2021 at AM  No Yes   Sig: Take 1 tablet (20 mg) by mouth 2 times daily   furosemide (LASIX) 20 MG tablet 8/11/2021 at AM  No Yes   Sig: Take 1 tablet (20 mg) by mouth daily   omeprazole (PRILOSEC) 40 MG DR capsule Not Taking at Unknown time    No No    Sig: Take 1 capsule (40 mg) by mouth daily   Patient not taking: Reported on 8/12/2021   oxyCODONE (ROXICODONE) 5 MG tablet 8/12/2021 at PRN  No Yes   Sig: Take 1 tablet (5 mg) by mouth every 8 hours as needed for   pain or severe pain   polyethylene glycol (MIRALAX) 17 GM/Dose powder  at PRN  No Yes   Sig: Take 17 g by mouth daily Do not give if having loose stools   Patient taking differently: Take 17 g by mouth daily as needed Do   not give if having loose stools   senna-docusate (SENOKOT-S/PERICOLACE) 8.6-50 MG tablet  at PRN    No Yes   Sig: Take 2 tablets by mouth 2 times daily Do not give if having   loose stools   Patient taking differently: Take 2 tablets by mouth 2 times daily   as needed Do not give if having loose stools      Facility-Administered Medications: None     Allergies   No Known Allergies    Social History   I have reviewed this patient's social history and updated it with   pertinent information if needed. Alexandra Alfaro  reports that he   quit smoking about 3 years ago. His smoking use included   cigarettes. He has never used smokeless tobacco. He reports that   he does not drink alcohol and does not use drugs.    Family History   I have reviewed this patient's family history and updated it with   pertinent information if needed.   Family History   Problem Relation Age of Onset     No Known Problems Brother      No Known Problems Mother      No Known Problems Sister      No Known Problems Daughter        Review of Systems       Physical Exam   Temp: 97.7  F (36.5  C) Temp src: Oral BP: (!) 80/44 Pulse: 96     Resp: 16 SpO2: 100 % O2 Device: Nasal cannula Oxygen Delivery: 2   LPM  Vital Signs with Ranges  Temp:  [96.1  F (35.6  C)-97.8  F (36.6  C)] 97.7  F (36.5  C)  Pulse:  [85-98] 96  Resp:  [11-30] 16  BP: ()/(44-78) 80/44  SpO2:  [98 %-100 %] 100 %  0 lbs 0 oz    Constitutional: Awake, alert, cooperative, no apparent distress.   ECOG PS 3  Eyes: Conjunctiva and pupils examined  there is pallor and severe   icterus  GI: Soft, abdomen is distended there is ascites hepatomegaly and   bruising on the chest wall   Neurologic: Cranial nerves 2-12 intact, normal strength and   sensation.  Psychiatric: Alert, oriented to person, place and time, no   obvious anxiety or depression.    Data   Laboratory studies reviewed CT scans reviewed discussed with Dr. Jacobo           Hemoglobin   Result Value Ref Range    Hemoglobin 7.7 (L) 13.3 - 17.7 g/dL   Lactic acid whole blood   Result Value Ref Range    Lactic Acid 3.4 (H) 0.7 - 2.0 mmol/L   Potassium   Result Value Ref Range    Potassium 5.9 (H) 3.4 - 5.3 mmol/L        170.18

## 2021-09-03 RX ORDER — ETONOGESTREL AND ETHINYL ESTRADIOL 11.7; 2.7 MG/1; MG/1
0.12-0.015 INSERT, EXTENDED RELEASE VAGINAL
Qty: 3 | Refills: 3 | Status: ACTIVE | COMMUNITY
Start: 2021-09-03 | End: 1900-01-01

## 2022-03-01 ENCOUNTER — LABORATORY RESULT (OUTPATIENT)
Age: 27
End: 2022-03-01

## 2022-03-01 ENCOUNTER — APPOINTMENT (OUTPATIENT)
Dept: OBGYN | Facility: CLINIC | Age: 27
End: 2022-03-01
Payer: COMMERCIAL

## 2022-03-01 VITALS — WEIGHT: 150 LBS | DIASTOLIC BLOOD PRESSURE: 83 MMHG | SYSTOLIC BLOOD PRESSURE: 127 MMHG

## 2022-03-01 PROCEDURE — 99395 PREV VISIT EST AGE 18-39: CPT

## 2022-03-01 NOTE — COUNSELING
[Contraception/ Emergency Contraception/ Safe Sexual Practices] : contraception, emergency contraception, safe sexual practices [FreeTextEntry2] : discussed nuvaring

## 2022-03-08 ENCOUNTER — LABORATORY RESULT (OUTPATIENT)
Age: 27
End: 2022-03-08

## 2022-03-08 LAB — CYTOLOGY CVX/VAG DOC THIN PREP: ABNORMAL

## 2022-03-31 RX ORDER — FLUCONAZOLE 150 MG/1
150 TABLET ORAL
Qty: 1 | Refills: 3 | Status: ACTIVE | COMMUNITY
Start: 2022-03-31 | End: 1900-01-01

## 2022-04-14 RX ORDER — ETONOGESTREL AND ETHINYL ESTRADIOL 11.7; 2.7 MG/1; MG/1
0.12-0.015 INSERT, EXTENDED RELEASE VAGINAL
Qty: 3 | Refills: 3 | Status: ACTIVE | COMMUNITY
Start: 2022-04-14 | End: 1900-01-01

## 2022-05-02 ENCOUNTER — EMERGENCY (EMERGENCY)
Facility: HOSPITAL | Age: 27
LOS: 0 days | Discharge: HOME | End: 2022-05-03
Attending: EMERGENCY MEDICINE | Admitting: EMERGENCY MEDICINE
Payer: COMMERCIAL

## 2022-05-02 VITALS
OXYGEN SATURATION: 98 % | HEART RATE: 88 BPM | DIASTOLIC BLOOD PRESSURE: 69 MMHG | RESPIRATION RATE: 18 BRPM | WEIGHT: 149.91 LBS | SYSTOLIC BLOOD PRESSURE: 117 MMHG | HEIGHT: 67 IN | TEMPERATURE: 98 F

## 2022-05-02 VITALS — TEMPERATURE: 101 F

## 2022-05-02 DIAGNOSIS — K52.9 NONINFECTIVE GASTROENTERITIS AND COLITIS, UNSPECIFIED: ICD-10-CM

## 2022-05-02 DIAGNOSIS — Z88.0 ALLERGY STATUS TO PENICILLIN: ICD-10-CM

## 2022-05-02 DIAGNOSIS — R10.9 UNSPECIFIED ABDOMINAL PAIN: ICD-10-CM

## 2022-05-02 DIAGNOSIS — R19.7 DIARRHEA, UNSPECIFIED: ICD-10-CM

## 2022-05-02 DIAGNOSIS — Z98.890 OTHER SPECIFIED POSTPROCEDURAL STATES: Chronic | ICD-10-CM

## 2022-05-02 DIAGNOSIS — R50.9 FEVER, UNSPECIFIED: ICD-10-CM

## 2022-05-02 DIAGNOSIS — Z88.6 ALLERGY STATUS TO ANALGESIC AGENT: ICD-10-CM

## 2022-05-02 DIAGNOSIS — R11.10 VOMITING, UNSPECIFIED: ICD-10-CM

## 2022-05-02 LAB
ALBUMIN SERPL ELPH-MCNC: 4.6 G/DL — SIGNIFICANT CHANGE UP (ref 3.5–5.2)
ALP SERPL-CCNC: 57 U/L — SIGNIFICANT CHANGE UP (ref 30–115)
ALT FLD-CCNC: 11 U/L — SIGNIFICANT CHANGE UP (ref 0–41)
ANION GAP SERPL CALC-SCNC: 10 MMOL/L — SIGNIFICANT CHANGE UP (ref 7–14)
APPEARANCE UR: CLEAR — SIGNIFICANT CHANGE UP
APTT BLD: 30 SEC — SIGNIFICANT CHANGE UP (ref 27–39.2)
AST SERPL-CCNC: 15 U/L — SIGNIFICANT CHANGE UP (ref 0–41)
BASOPHILS # BLD AUTO: 0.02 K/UL — SIGNIFICANT CHANGE UP (ref 0–0.2)
BASOPHILS NFR BLD AUTO: 0.2 % — SIGNIFICANT CHANGE UP (ref 0–1)
BILIRUB SERPL-MCNC: 0.6 MG/DL — SIGNIFICANT CHANGE UP (ref 0.2–1.2)
BILIRUB UR-MCNC: NEGATIVE — SIGNIFICANT CHANGE UP
BUN SERPL-MCNC: 15 MG/DL — SIGNIFICANT CHANGE UP (ref 10–20)
CALCIUM SERPL-MCNC: 10.1 MG/DL — SIGNIFICANT CHANGE UP (ref 8.5–10.1)
CHLORIDE SERPL-SCNC: 103 MMOL/L — SIGNIFICANT CHANGE UP (ref 98–110)
CO2 SERPL-SCNC: 27 MMOL/L — SIGNIFICANT CHANGE UP (ref 17–32)
COLOR SPEC: YELLOW — SIGNIFICANT CHANGE UP
CREAT SERPL-MCNC: 0.9 MG/DL — SIGNIFICANT CHANGE UP (ref 0.7–1.5)
DIFF PNL FLD: NEGATIVE — SIGNIFICANT CHANGE UP
EGFR: 91 ML/MIN/1.73M2 — SIGNIFICANT CHANGE UP
EOSINOPHIL # BLD AUTO: 0.05 K/UL — SIGNIFICANT CHANGE UP (ref 0–0.7)
EOSINOPHIL NFR BLD AUTO: 0.4 % — SIGNIFICANT CHANGE UP (ref 0–8)
GLUCOSE SERPL-MCNC: 93 MG/DL — SIGNIFICANT CHANGE UP (ref 70–99)
GLUCOSE UR QL: NEGATIVE MG/DL — SIGNIFICANT CHANGE UP
HCG SERPL QL: NEGATIVE — SIGNIFICANT CHANGE UP
HCT VFR BLD CALC: 40 % — SIGNIFICANT CHANGE UP (ref 37–47)
HGB BLD-MCNC: 12.9 G/DL — SIGNIFICANT CHANGE UP (ref 12–16)
IMM GRANULOCYTES NFR BLD AUTO: 0.4 % — HIGH (ref 0.1–0.3)
INR BLD: 1.11 RATIO — SIGNIFICANT CHANGE UP (ref 0.65–1.3)
KETONES UR-MCNC: 15
LEUKOCYTE ESTERASE UR-ACNC: NEGATIVE — SIGNIFICANT CHANGE UP
LIDOCAIN IGE QN: 27 U/L — SIGNIFICANT CHANGE UP (ref 7–60)
LYMPHOCYTES # BLD AUTO: 0.65 K/UL — LOW (ref 1.2–3.4)
LYMPHOCYTES # BLD AUTO: 5.1 % — LOW (ref 20.5–51.1)
MCHC RBC-ENTMCNC: 30 PG — SIGNIFICANT CHANGE UP (ref 27–31)
MCHC RBC-ENTMCNC: 32.3 G/DL — SIGNIFICANT CHANGE UP (ref 32–37)
MCV RBC AUTO: 93 FL — SIGNIFICANT CHANGE UP (ref 81–99)
MONOCYTES # BLD AUTO: 0.43 K/UL — SIGNIFICANT CHANGE UP (ref 0.1–0.6)
MONOCYTES NFR BLD AUTO: 3.4 % — SIGNIFICANT CHANGE UP (ref 1.7–9.3)
NEUTROPHILS # BLD AUTO: 11.55 K/UL — HIGH (ref 1.4–6.5)
NEUTROPHILS NFR BLD AUTO: 90.5 % — HIGH (ref 42.2–75.2)
NITRITE UR-MCNC: NEGATIVE — SIGNIFICANT CHANGE UP
NRBC # BLD: 0 /100 WBCS — SIGNIFICANT CHANGE UP (ref 0–0)
PH UR: 6 — SIGNIFICANT CHANGE UP (ref 5–8)
PLATELET # BLD AUTO: 197 K/UL — SIGNIFICANT CHANGE UP (ref 130–400)
POTASSIUM SERPL-MCNC: 4.2 MMOL/L — SIGNIFICANT CHANGE UP (ref 3.5–5)
POTASSIUM SERPL-SCNC: 4.2 MMOL/L — SIGNIFICANT CHANGE UP (ref 3.5–5)
PROT SERPL-MCNC: 7.6 G/DL — SIGNIFICANT CHANGE UP (ref 6–8)
PROT UR-MCNC: NEGATIVE MG/DL — SIGNIFICANT CHANGE UP
PROTHROM AB SERPL-ACNC: 12.8 SEC — SIGNIFICANT CHANGE UP (ref 9.95–12.87)
RBC # BLD: 4.3 M/UL — SIGNIFICANT CHANGE UP (ref 4.2–5.4)
RBC # FLD: 12.3 % — SIGNIFICANT CHANGE UP (ref 11.5–14.5)
SODIUM SERPL-SCNC: 140 MMOL/L — SIGNIFICANT CHANGE UP (ref 135–146)
SP GR SPEC: 1.02 — SIGNIFICANT CHANGE UP (ref 1.01–1.03)
UROBILINOGEN FLD QL: 0.2 MG/DL — SIGNIFICANT CHANGE UP
WBC # BLD: 12.75 K/UL — HIGH (ref 4.8–10.8)
WBC # FLD AUTO: 12.75 K/UL — HIGH (ref 4.8–10.8)

## 2022-05-02 PROCEDURE — 74177 CT ABD & PELVIS W/CONTRAST: CPT | Mod: 26,MA

## 2022-05-02 PROCEDURE — 99285 EMERGENCY DEPT VISIT HI MDM: CPT

## 2022-05-02 RX ORDER — METRONIDAZOLE 500 MG
500 TABLET ORAL ONCE
Refills: 0 | Status: COMPLETED | OUTPATIENT
Start: 2022-05-02 | End: 2022-05-02

## 2022-05-02 RX ORDER — FAMOTIDINE 10 MG/ML
25 INJECTION INTRAVENOUS ONCE
Refills: 0 | Status: DISCONTINUED | OUTPATIENT
Start: 2022-05-02 | End: 2022-05-02

## 2022-05-02 RX ORDER — SODIUM CHLORIDE 9 MG/ML
1000 INJECTION INTRAMUSCULAR; INTRAVENOUS; SUBCUTANEOUS ONCE
Refills: 0 | Status: COMPLETED | OUTPATIENT
Start: 2022-05-02 | End: 2022-05-02

## 2022-05-02 RX ORDER — MOXIFLOXACIN HYDROCHLORIDE TABLETS, 400 MG 400 MG/1
1 TABLET, FILM COATED ORAL
Qty: 20 | Refills: 0
Start: 2022-05-02 | End: 2022-05-11

## 2022-05-02 RX ORDER — ONDANSETRON 8 MG/1
4 TABLET, FILM COATED ORAL ONCE
Refills: 0 | Status: COMPLETED | OUTPATIENT
Start: 2022-05-02 | End: 2022-05-02

## 2022-05-02 RX ORDER — ACETAMINOPHEN 500 MG
650 TABLET ORAL ONCE
Refills: 0 | Status: COMPLETED | OUTPATIENT
Start: 2022-05-02 | End: 2022-05-02

## 2022-05-02 RX ORDER — CIPROFLOXACIN LACTATE 400MG/40ML
500 VIAL (ML) INTRAVENOUS ONCE
Refills: 0 | Status: COMPLETED | OUTPATIENT
Start: 2022-05-02 | End: 2022-05-02

## 2022-05-02 RX ORDER — FAMOTIDINE 10 MG/ML
20 INJECTION INTRAVENOUS ONCE
Refills: 0 | Status: COMPLETED | OUTPATIENT
Start: 2022-05-02 | End: 2022-05-02

## 2022-05-02 RX ORDER — METRONIDAZOLE 500 MG
1 TABLET ORAL
Qty: 21 | Refills: 0
Start: 2022-05-02 | End: 2022-05-08

## 2022-05-02 RX ORDER — IOHEXOL 300 MG/ML
30 INJECTION, SOLUTION INTRAVENOUS ONCE
Refills: 0 | Status: COMPLETED | OUTPATIENT
Start: 2022-05-02 | End: 2022-05-02

## 2022-05-02 RX ORDER — MORPHINE SULFATE 50 MG/1
2 CAPSULE, EXTENDED RELEASE ORAL ONCE
Refills: 0 | Status: DISCONTINUED | OUTPATIENT
Start: 2022-05-02 | End: 2022-05-02

## 2022-05-02 RX ADMIN — Medication 650 MILLIGRAM(S): at 22:46

## 2022-05-02 RX ADMIN — ONDANSETRON 104 MILLIGRAM(S): 8 TABLET, FILM COATED ORAL at 22:46

## 2022-05-02 RX ADMIN — FAMOTIDINE 20 MILLIGRAM(S): 10 INJECTION INTRAVENOUS at 17:48

## 2022-05-02 RX ADMIN — MORPHINE SULFATE 2 MILLIGRAM(S): 50 CAPSULE, EXTENDED RELEASE ORAL at 17:47

## 2022-05-02 RX ADMIN — ONDANSETRON 104 MILLIGRAM(S): 8 TABLET, FILM COATED ORAL at 17:47

## 2022-05-02 RX ADMIN — IOHEXOL 30 MILLILITER(S): 300 INJECTION, SOLUTION INTRAVENOUS at 17:48

## 2022-05-02 RX ADMIN — SODIUM CHLORIDE 1 MILLILITER(S): 9 INJECTION INTRAMUSCULAR; INTRAVENOUS; SUBCUTANEOUS at 17:48

## 2022-05-02 NOTE — ED PROVIDER NOTE - ATTENDING APP SHARED VISIT CONTRIBUTION OF CARE
25 yo F presents for evaluation of  abd pain , started today while taking a class.  Pt with vomiting and episode of loose stool,  no urinary complaints, no recent illness,  no rash , On exam pt in NAD AAO X 3, non toxic, OP clear, MMM, Lungs cta b/l no wrr, abd soft nd, + tender lower abdomen, no rebound, no guarding

## 2022-05-02 NOTE — ED PROVIDER NOTE - CLINICAL SUMMARY MEDICAL DECISION MAKING FREE TEXT BOX
26-year-old female no past medical history presents with mid abdominal pain associated with vomiting and diarrhea nonbloody became low-grade fever in ED WBC 12 urine clear CT abdomen pelvis with IV and oral contrast Mild circumferential thickening of the distal duodenum and proximal   	ascending colon, nonspecific possibly infectious/inflammatory in etiology  	  	Normal caliber appendix.  Antibiotics given repeat abdominal exam nontender  Patient to be discharged from ED in well appearing condition. Any available test results were discussed with and printed  for patient.  Verbal instructions given, including instructions to return to ED immediately for any new, worsening, or concerning symptoms. Limitations of ED work up discussed.  Patient reports understanding of above with capacity and insight. Written discharge instructions additionally given, including follow-up plan.

## 2022-05-02 NOTE — ED PROVIDER NOTE - NS ED ATTENDING STATEMENT MOD
This was a shared visit with the JAMEE. I reviewed and verified the documentation and independently performed the documented:

## 2022-05-02 NOTE — ED PROVIDER NOTE - PATIENT PORTAL LINK FT
You can access the FollowMyHealth Patient Portal offered by [facility] by registering at the following website: [email or web address]. By joining [facility]’s FollowMyHealth portal, you will also be able to view your health information using other applications (apps) compatible with our system.

## 2022-05-02 NOTE — ED PROVIDER NOTE - NS ED ROS FT
Review of Systems:  	•	CONSTITUTIONAL - no fever, no diaphoresis, no chills  	•	SKIN - no rash  	•	HEMATOLOGIC - no bleeding, no bruising  	•	EYES - no eye pain, no blurry vision  	•	ENT - no change in hearing, no sore throat, no ear pain or tinnitus  	•	RESPIRATORY - no shortness of breath, no cough  	•	CARDIAC - no chest pain, no palpitations  	•	GI - abd pain, nausea,  vomiting,and  diarrhea, no constipation  	•	GENITO-URINARY - no discharge, no dysuria; no hematuria, no increased urinary frequency  	•	MUSCULOSKELETAL - no joint paint, no swelling, no redness  	•	NEUROLOGIC - no weakness, no headache, no paresthesias, no LOC  	•	PSYCH - no anxiety, non suicidal, non homicidal, no hallucination, no depression

## 2022-05-02 NOTE — ED PROVIDER NOTE - CARE PROVIDER_API CALL
Madhavi Chris (MD)  Gastroenterology  [address] [place] [address]  Phone: [phone]  Fax: [phone]  Follow Up Time:

## 2022-05-02 NOTE — ED PROVIDER NOTE - OBJECTIVE STATEMENT
this is a 27 yo female presents to ed for evaluation of abdominal pain which started today. patient states she had episodes of diarrhea and then was vomiting.

## 2022-05-02 NOTE — ED PROVIDER NOTE - PHYSICAL EXAMINATION
--EXAM--  VITAL SIGNS: I have reviewed vs documented at present.  CONSTITUTIONAL: Well-developed; well-nourished; in no acute distress.   SKIN: Warm and dry, no acute rash.   HEAD: Normocephalic; atraumatic.  EYES: PERRL, EOM intact; conjunctiva and sclera clear. No nystagmus.  ENT: No nasal discharge; airway clear.  NECK: Supple; non tender.  CARD: S1, S2, Regular rate and rhythm.   RESP: No wheezes, rales or rhonchi.  ABD: Normal bowel sounds; soft; non-distended; lower abdominal tenderness   EXT: Normal ROM.   NEURO: Alert, oriented, grossly unremarkable. Strength 5/5 in all extremities. Sensation intact throughout.  PSYCH: Cooperative, appropriate.

## 2022-05-03 RX ADMIN — Medication 500 MILLIGRAM(S): at 00:25

## 2022-05-04 LAB
CULTURE RESULTS: SIGNIFICANT CHANGE UP
SPECIMEN SOURCE: SIGNIFICANT CHANGE UP

## 2022-07-11 RX ORDER — ETONOGESTREL AND ETHINYL ESTRADIOL 11.7; 2.7 MG/1; MG/1
0.12-0.015 INSERT, EXTENDED RELEASE VAGINAL
Qty: 3 | Refills: 3 | Status: ACTIVE | COMMUNITY
Start: 2022-07-11 | End: 1900-01-01

## 2023-03-07 ENCOUNTER — NON-APPOINTMENT (OUTPATIENT)
Age: 28
End: 2023-03-07

## 2023-03-07 ENCOUNTER — APPOINTMENT (OUTPATIENT)
Dept: OBGYN | Facility: CLINIC | Age: 28
End: 2023-03-07
Payer: COMMERCIAL

## 2023-03-07 VITALS
BODY MASS INDEX: 24.33 KG/M2 | HEIGHT: 67 IN | DIASTOLIC BLOOD PRESSURE: 82 MMHG | SYSTOLIC BLOOD PRESSURE: 126 MMHG | WEIGHT: 155 LBS

## 2023-03-07 PROCEDURE — 99395 PREV VISIT EST AGE 18-39: CPT

## 2023-03-11 LAB — CYTOLOGY CVX/VAG DOC THIN PREP: NORMAL

## 2023-07-30 ENCOUNTER — RX RENEWAL (OUTPATIENT)
Age: 28
End: 2023-07-30

## 2023-07-30 RX ORDER — ETONOGESTREL AND ETHINYL ESTRADIOL .12; .015 MG/D; MG/D
0.12-0.015 RING VAGINAL
Qty: 3 | Refills: 0 | Status: ACTIVE | COMMUNITY
Start: 2023-07-30 | End: 1900-01-01

## 2023-09-08 DIAGNOSIS — Z30.9 ENCOUNTER FOR CONTRACEPTIVE MANAGEMENT, UNSPECIFIED: ICD-10-CM

## 2023-09-08 RX ORDER — ETONOGESTREL AND ETHINYL ESTRADIOL 11.7; 2.7 MG/1; MG/1
0.12-0.015 INSERT, EXTENDED RELEASE VAGINAL
Qty: 3 | Refills: 3 | Status: ACTIVE | COMMUNITY
Start: 2023-09-08 | End: 1900-01-01

## 2024-05-20 ENCOUNTER — RX RENEWAL (OUTPATIENT)
Age: 29
End: 2024-05-20

## 2024-05-20 RX ORDER — ETONOGESTREL AND ETHINYL ESTRADIOL .015; .12 MG/D; MG/D
0.12-0.015 INSERT, EXTENDED RELEASE VAGINAL
Qty: 3 | Refills: 0 | Status: ACTIVE | COMMUNITY
Start: 2024-05-20 | End: 1900-01-01

## 2024-06-10 ENCOUNTER — LABORATORY RESULT (OUTPATIENT)
Age: 29
End: 2024-06-10

## 2024-06-11 ENCOUNTER — APPOINTMENT (OUTPATIENT)
Dept: OBGYN | Facility: CLINIC | Age: 29
End: 2024-06-11
Payer: COMMERCIAL

## 2024-06-11 VITALS — HEIGHT: 68 IN | BODY MASS INDEX: 21.98 KG/M2 | WEIGHT: 145 LBS

## 2024-06-11 DIAGNOSIS — Z01.419 ENCOUNTER FOR GYNECOLOGICAL EXAMINATION (GENERAL) (ROUTINE) W/OUT ABNORMAL FINDINGS: ICD-10-CM

## 2024-06-11 PROCEDURE — 99395 PREV VISIT EST AGE 18-39: CPT

## 2024-06-11 NOTE — PLAN
[FreeTextEntry1] : GABRIELLE CASALE is a 28 year female for pap and annual  Pt with regular cycles.  Discussed birth control and future pregnancy.

## 2024-06-11 NOTE — HISTORY OF PRESENT ILLNESS
[TextBox_4] : GABRIELLE CASALE is a 28 year female for annual   Pt with regular cycles.  Discussed birth control and future pregnancy.

## 2024-06-11 NOTE — REASON FOR VISIT
[facility] team at bedside with family & respiratory therapy. hand off given to [facility]. unable to obtain urine sample d/t pt not making urine. [facility] team at bedside with family & respiratory therapy. hand off given to [facility]. [Annual] : an annual visit.

## 2024-06-24 LAB — CYTOLOGY CVX/VAG DOC THIN PREP: ABNORMAL

## 2024-07-04 NOTE — ED ADULT NURSE NOTE - NS ED NURSE DISCH DISPOSITION
Please notify patient that Chlamydia, Gonorrhea, Trichomonas results were all negative. Thank you.  Discharged

## 2024-10-07 ENCOUNTER — RX RENEWAL (OUTPATIENT)
Age: 29
End: 2024-10-07

## 2024-12-30 ENCOUNTER — RX RENEWAL (OUTPATIENT)
Age: 29
End: 2024-12-30

## 2025-03-28 ENCOUNTER — RX RENEWAL (OUTPATIENT)
Age: 30
End: 2025-03-28

## 2025-06-21 ENCOUNTER — RX RENEWAL (OUTPATIENT)
Age: 30
End: 2025-06-21

## 2025-07-01 ENCOUNTER — APPOINTMENT (OUTPATIENT)
Dept: OBGYN | Facility: CLINIC | Age: 30
End: 2025-07-01
Payer: COMMERCIAL

## 2025-07-01 ENCOUNTER — NON-APPOINTMENT (OUTPATIENT)
Age: 30
End: 2025-07-01

## 2025-07-01 VITALS
WEIGHT: 150 LBS | HEIGHT: 67 IN | SYSTOLIC BLOOD PRESSURE: 118 MMHG | DIASTOLIC BLOOD PRESSURE: 72 MMHG | BODY MASS INDEX: 23.54 KG/M2

## 2025-07-01 PROCEDURE — 99395 PREV VISIT EST AGE 18-39: CPT

## 2025-09-15 ENCOUNTER — RX RENEWAL (OUTPATIENT)
Age: 30
End: 2025-09-15

## 2025-09-16 RX ORDER — ETONOGESTREL AND ETHINYL ESTRADIOL 11.7; 2.7 MG/1; MG/1
0.12-0.015 INSERT, EXTENDED RELEASE VAGINAL
Qty: 1 | Refills: 3 | Status: ACTIVE | COMMUNITY
Start: 2025-09-15 | End: 1900-01-01